# Patient Record
Sex: MALE
[De-identification: names, ages, dates, MRNs, and addresses within clinical notes are randomized per-mention and may not be internally consistent; named-entity substitution may affect disease eponyms.]

---

## 2022-12-13 ENCOUNTER — DASHBOARD ENCOUNTERS (OUTPATIENT)
Age: 42
End: 2022-12-13

## 2022-12-13 ENCOUNTER — LAB OUTSIDE AN ENCOUNTER (OUTPATIENT)
Dept: URBAN - METROPOLITAN AREA CLINIC 96 | Facility: CLINIC | Age: 42
End: 2022-12-13

## 2022-12-13 ENCOUNTER — OFFICE VISIT (OUTPATIENT)
Dept: URBAN - METROPOLITAN AREA CLINIC 50 | Facility: CLINIC | Age: 42
End: 2022-12-13
Payer: COMMERCIAL

## 2022-12-13 ENCOUNTER — WEB ENCOUNTER (OUTPATIENT)
Dept: URBAN - METROPOLITAN AREA CLINIC 50 | Facility: CLINIC | Age: 42
End: 2022-12-13

## 2022-12-13 VITALS
TEMPERATURE: 97.7 F | SYSTOLIC BLOOD PRESSURE: 121 MMHG | HEART RATE: 61 BPM | WEIGHT: 192.6 LBS | BODY MASS INDEX: 26.96 KG/M2 | HEIGHT: 71 IN | DIASTOLIC BLOOD PRESSURE: 77 MMHG

## 2022-12-13 DIAGNOSIS — R19.5 LOOSE STOOLS: ICD-10-CM

## 2022-12-13 PROCEDURE — 99204 OFFICE O/P NEW MOD 45 MIN: CPT | Performed by: INTERNAL MEDICINE

## 2022-12-13 PROCEDURE — 99244 OFF/OP CNSLTJ NEW/EST MOD 40: CPT | Performed by: INTERNAL MEDICINE

## 2022-12-13 NOTE — HPI-TODAY'S VISIT:
here to discuss digestive issues per wife   referred by Ki Becker for diarrhea copy of the note sent to sukh VENEGAS  has diarrhea after a lot of meals can go 3-7 x a day carbs are a major issue for him no blood in stool no black stool no abd pain no weight stool no n/v/gerd is half [place], half japanese high carb meals given him issues loose stools sometimes urgent BMs, right after meals very rare nortcutrnal awakenings but if high CARB then will have diarrhea

## 2022-12-15 LAB
A/G RATIO: 2.3
ALBUMIN: 4.4
ALKALINE PHOSPHATASE: 54
ALT (SGPT): 18
AST (SGOT): 17
BILIRUBIN, TOTAL: 0.5
BUN/CREATININE RATIO: 13
BUN: 16
C-REACTIVE PROTEIN, QUANT: <1
CALCIUM: 9.1
CARBON DIOXIDE, TOTAL: 25
CHLORIDE: 100
CREATININE: 1.24
EGFR: 74
ENDOMYSIAL ANTIBODY IGA: NEGATIVE
GLOBULIN, TOTAL: 1.9
GLUCOSE: 98
IMMUNOGLOBULIN A, QN, SERUM: 131
POTASSIUM: 4.4
PROTEIN, TOTAL: 6.3
SODIUM: 138
T-TRANSGLUTAMINASE (TTG) IGA: <2